# Patient Record
Sex: MALE | Employment: FULL TIME | URBAN - METROPOLITAN AREA
[De-identification: names, ages, dates, MRNs, and addresses within clinical notes are randomized per-mention and may not be internally consistent; named-entity substitution may affect disease eponyms.]

---

## 2017-11-08 NOTE — PROGRESS NOTES
SUBJECTIVE:   CC: Julio Domingo is an 27 year old male who presents for preventative health visit.     Physical   Annual:     Getting at least 3 servings of Calcium per day::  NO    Bi-annual eye exam::  Yes    Dental care twice a year::  NO    Sleep apnea or symptoms of sleep apnea::  Daytime drowsiness    Diet::  Regular (no restrictions) and Breakfast skipped    Frequency of exercise::  None    Taking medications regularly::  No    Barriers to taking medications::  None    Medication side effects::  Not applicable    Additional concerns today::  No    Mole on lip and back of neck he would like removed.      Today's PHQ-2 Score:   PHQ-2 ( 1999 [employer]) 11/10/2017   Q1: Little interest or pleasure in doing things 0   Q2: Feeling down, depressed or hopeless 0   PHQ-2 Score 0   Q1: Little interest or pleasure in doing things Not at all   Q2: Feeling down, depressed or hopeless Not at all   PHQ-2 Score 0     Answers for HPI/ROS submitted by the patient on 11/10/2017   PHQ-2 Score: 0    Abuse: Current or Past(Physical, Sexual or Emotional)- No  Do you feel safe in your environment - Yes    Social History   Substance Use Topics     Smoking status: Former Smoker     Smokeless tobacco: Never Used      Comment: smokes 1-2 cigarettes a day     Alcohol use 0.0 oz/week     0 Standard drinks or equivalent per week      Comment: once a week     The patient does not drink >3 drinks per day nor >7 drinks per week.    Last PSA: No results found for: PSA    Reviewed orders with patient. Reviewed health maintenance and updated orders accordingly - Yes      Reviewed and updated as needed this visit by clinical staff  Tobacco  Allergies  Med Hx  Surg Hx  Fam Hx  Soc Hx        Reviewed and updated as needed this visit by Provider          Review of Systems   Constitutional: Negative.  Negative for chills and fever.   HENT: Negative.  Negative for congestion, ear pain, hearing loss and sore throat.    Eyes: Negative.  Negative for  "pain and visual disturbance.   Respiratory: Negative.  Negative for cough and shortness of breath.    Cardiovascular: Negative.  Negative for chest pain, palpitations and peripheral edema.   Gastrointestinal: Negative.  Negative for abdominal pain, constipation, diarrhea, heartburn, hematochezia and nausea.   Endocrine: Negative.    Genitourinary: Negative.  Negative for discharge, dysuria, frequency, genital sores, hematuria, impotence and urgency.   Musculoskeletal: Negative.  Negative for arthralgias, joint swelling and myalgias.   Skin: Negative.  Negative for rash.   Allergic/Immunologic: Negative.    Neurological: Negative.  Negative for dizziness, weakness, headaches and paresthesias.   Hematological: Negative.    Psychiatric/Behavioral: Negative.  Negative for mood changes. The patient is not nervous/anxious.        OBJECTIVE:   /86 (BP Location: Right arm, Patient Position: Chair, Cuff Size: Adult Regular)  Pulse 72  Temp 98.6  F (37  C) (Temporal)  Resp 20  Ht 5' 11.65\" (1.82 m)  Wt 287 lb (130.2 kg)  SpO2 99%  BMI 39.3 kg/m2    Physical Exam  GENERAL: healthy, alert and no distress  EYES: Eyes grossly normal to inspection, PERRL and conjunctivae and sclerae normal  HENT: ear canals and TM's normal with a moderate amount of cerumen in the right ear canal, nose and mouth without ulcers or lesions  NECK: no adenopathy, no asymmetry, masses, or scars and thyroid normal to palpation  RESP: lungs clear to auscultation - no rales, rhonchi or wheezes  CV: regular rate and rhythm, normal S1 S2, no S3 or S4, no murmur, click or rub, no peripheral edema and peripheral pulses strong  ABDOMEN: soft, nontender, no hepatosplenomegaly, no masses and bowel sounds normal   (male): normal male circumcised genitalia without lesions or urethral discharge, no hernia  MS: no gross musculoskeletal defects noted, no edema. FROM to all extremities. No spinal tenderness.   SKIN: Multiple darkened, symmetric nevi over " chest and back. Tan colored papule over left lower lip.   NEURO: Normal strength and tone, mentation intact and speech normal. Cranial nerves II-XII are grossly intact. DTRs are 2+/4 throughout and symmetric. Gait is stable.   PSYCH: mentation appears normal, affect normal/bright    ASSESSMENT/PLAN:       ICD-10-CM    1. Encounter for routine adult health examination without abnormal findings Z00.00 Basic metabolic panel  (Ca, Cl, CO2, Creat, Gluc, K, Na, BUN)   2. Class 2 obesity due to excess calories without serious comorbidity with body mass index (BMI) of 38.0 to 38.9 in adult E66.09 Basic metabolic panel  (Ca, Cl, CO2, Creat, Gluc, K, Na, BUN)    Z68.38    3. Impacted cerumen of right ear H61.21    4. Skin lesion L98.9 DERMATOLOGY REFERRAL   5. CARDIOVASCULAR SCREENING; LDL GOAL LESS THAN 160 Z13.6 Lipid panel reflex to direct LDL Fasting   6. FH: thyroid condition Z83.49 TSH with free T4 reflex       I discussed the importance of a healthy diet and exercise to help him lose weight and avoid progressing to diabetes.  Fasting labs ordered today.    Nursing staff used 1 bottle to irrigate the right ear but this was unsuccessful. I was only able to remove a small amount of cerumen with a cerumen spoon so I recommend Debrox drops for 3 days and if it does not come out, he can come back in for another irrigation.     Referral placed for dermatology to discuss removal of lesion on lip (has been there his whole life) and some moles on his neck.     Follow up annually.     COUNSELING:   Reviewed preventive health counseling, as reflected in patient instructions       Regular exercise       Healthy diet/nutrition    BP Screening:   Last 3 BP Readings:    BP Readings from Last 3 Encounters:   11/10/17 120/86   11/04/16 122/82   10/19/15 118/86          reports that he has quit smoking. He has never used smokeless tobacco.      Estimated body mass index is 39.3 kg/(m^2) as calculated from the following:    Height as of  "this encounter: 5' 11.65\" (1.82 m).    Weight as of this encounter: 287 lb (130.2 kg).   Weight management plan: Discussed healthy diet and exercise guidelines and patient will follow up in 12 months in clinic to re-evaluate.    Counseling Resources:  ATP IV Guidelines  Pooled Cohorts Equation Calculator  FRAX Risk Assessment  ICSI Preventive Guidelines  Dietary Guidelines for Americans, 2010  USDA's MyPlate  ASA Prophylaxis  Lung CA Screening    Mick Barrientos PA-C  [facility]  "

## 2017-11-08 NOTE — PATIENT INSTRUCTIONS
Preventive Health Recommendations  Male Ages 18 - 25     Yearly exam:             See your health care provider every year in order to  o   Review health changes.   o   Discuss preventive care.    o   Review your medicines if your doctor has prescribed any.    You should be tested each year for STDs (sexually transmitted diseases).     Talk to your provider about cholesterol testing.      If you are at risk for diabetes, you should have a diabetes test (fasting glucose).    Shots: Get a flu shot each year. Get a tetanus shot every 10 years.     Nutrition:    Eat at least 5 servings of fruits and vegetables daily.     Eat whole-grain bread, whole-wheat pasta and brown rice instead of white grains and rice.     Talk to your provider about calcium and Vitamin D.     Lifestyle    Exercise for at least 150 minutes a week (30 minutes a day, 5 days a week). This will help you control your weight and prevent disease.     Limit alcohol to one drink per day.     No smoking.     Wear sunscreen to prevent skin cancer.     See your dentist every six months for an exam and cleaning.       Work on a healthier diet with less fast food and carbs. Try to exercise 3-5 days per week.    I will let you know about your lab results.    You can call to schedule a dermatology appointment.

## 2017-11-08 NOTE — PROGRESS NOTES
"  SUBJECTIVE:                                                    Julio Domingo is a 27 year old male who presents to clinic today for the following health issues:  {Provider please address medication reconciliation discrepancies--rooming staff please delete if no med/rec issues}    HPI    {additional problems for roomer to add, delete if none:462605}    Problem list and histories reviewed & adjusted, as indicated.  Additional history: {NONE - AS DOCUMENTED:246856::\"as documented\"}    {ACUTE Problem SUPERLIST - brief histories:324251}    {HIST REVIEW/ LINKS 2:464627}    {PROVIDER CHARTING PREFERENCE:298772}  "

## 2017-11-10 ENCOUNTER — OFFICE VISIT (OUTPATIENT)
Dept: FAMILY MEDICINE | Facility: OTHER | Age: 27
End: 2017-11-10
Payer: COMMERCIAL

## 2017-11-10 VITALS
OXYGEN SATURATION: 99 % | SYSTOLIC BLOOD PRESSURE: 120 MMHG | BODY MASS INDEX: 38.87 KG/M2 | DIASTOLIC BLOOD PRESSURE: 86 MMHG | HEIGHT: 72 IN | TEMPERATURE: 98.6 F | RESPIRATION RATE: 20 BRPM | HEART RATE: 72 BPM | WEIGHT: 287 LBS

## 2017-11-10 DIAGNOSIS — Z83.49 FH: THYROID CONDITION: ICD-10-CM

## 2017-11-10 DIAGNOSIS — E66.812 CLASS 2 OBESITY DUE TO EXCESS CALORIES WITHOUT SERIOUS COMORBIDITY WITH BODY MASS INDEX (BMI) OF 38.0 TO 38.9 IN ADULT: ICD-10-CM

## 2017-11-10 DIAGNOSIS — E66.09 CLASS 2 OBESITY DUE TO EXCESS CALORIES WITHOUT SERIOUS COMORBIDITY WITH BODY MASS INDEX (BMI) OF 38.0 TO 38.9 IN ADULT: ICD-10-CM

## 2017-11-10 DIAGNOSIS — L98.9 SKIN LESION: ICD-10-CM

## 2017-11-10 DIAGNOSIS — H61.21 IMPACTED CERUMEN OF RIGHT EAR: ICD-10-CM

## 2017-11-10 DIAGNOSIS — Z00.00 ENCOUNTER FOR ROUTINE ADULT HEALTH EXAMINATION WITHOUT ABNORMAL FINDINGS: Primary | ICD-10-CM

## 2017-11-10 DIAGNOSIS — Z13.6 CARDIOVASCULAR SCREENING; LDL GOAL LESS THAN 160: ICD-10-CM

## 2017-11-10 LAB
ANION GAP SERPL CALCULATED.3IONS-SCNC: 8 MMOL/L (ref 3–14)
BUN SERPL-MCNC: 15 MG/DL (ref 7–30)
CALCIUM SERPL-MCNC: 9.3 MG/DL (ref 8.5–10.1)
CHLORIDE SERPL-SCNC: 106 MMOL/L (ref 94–109)
CHOLEST SERPL-MCNC: 201 MG/DL
CO2 SERPL-SCNC: 27 MMOL/L (ref 20–32)
CREAT SERPL-MCNC: 0.83 MG/DL (ref 0.66–1.25)
GFR SERPL CREATININE-BSD FRML MDRD: >90 ML/MIN/1.7M2
GLUCOSE SERPL-MCNC: 99 MG/DL (ref 70–99)
HDLC SERPL-MCNC: 40 MG/DL
LDLC SERPL CALC-MCNC: 133 MG/DL
NONHDLC SERPL-MCNC: 161 MG/DL
POTASSIUM SERPL-SCNC: 4.1 MMOL/L (ref 3.4–5.3)
SODIUM SERPL-SCNC: 141 MMOL/L (ref 133–144)
TRIGL SERPL-MCNC: 142 MG/DL
TSH SERPL DL<=0.005 MIU/L-ACNC: 1.48 MU/L (ref 0.4–4)

## 2017-11-10 PROCEDURE — 99395 PREV VISIT EST AGE 18-39: CPT | Performed by: PHYSICIAN ASSISTANT

## 2017-11-10 PROCEDURE — 80061 LIPID PANEL: CPT | Performed by: PHYSICIAN ASSISTANT

## 2017-11-10 PROCEDURE — 36415 COLL VENOUS BLD VENIPUNCTURE: CPT | Performed by: PHYSICIAN ASSISTANT

## 2017-11-10 PROCEDURE — 80048 BASIC METABOLIC PNL TOTAL CA: CPT | Performed by: PHYSICIAN ASSISTANT

## 2017-11-10 PROCEDURE — 84443 ASSAY THYROID STIM HORMONE: CPT | Performed by: PHYSICIAN ASSISTANT

## 2017-11-10 ASSESSMENT — ENCOUNTER SYMPTOMS
FREQUENCY: 0
DYSURIA: 0
MUSCULOSKELETAL NEGATIVE: 1
EYE PAIN: 0
SORE THROAT: 0
RESPIRATORY NEGATIVE: 1
PALPITATIONS: 0
CARDIOVASCULAR NEGATIVE: 1
PSYCHIATRIC NEGATIVE: 1
CHILLS: 0
EYES NEGATIVE: 1
DIZZINESS: 0
CONSTIPATION: 0
ENDOCRINE NEGATIVE: 1
DIARRHEA: 0
FEVER: 0
SHORTNESS OF BREATH: 0
HEMATURIA: 0
NERVOUS/ANXIOUS: 0
ALLERGIC/IMMUNOLOGIC NEGATIVE: 1
NAUSEA: 0
GASTROINTESTINAL NEGATIVE: 1
NEUROLOGICAL NEGATIVE: 1
CONSTITUTIONAL NEGATIVE: 1
JOINT SWELLING: 0
WEAKNESS: 0
HEMATOLOGIC/LYMPHATIC NEGATIVE: 1
PARESTHESIAS: 0
MYALGIAS: 0
HEADACHES: 0
COUGH: 0
HEMATOCHEZIA: 0
ABDOMINAL PAIN: 0
ARTHRALGIAS: 0
HEARTBURN: 0

## 2017-11-10 ASSESSMENT — PAIN SCALES - GENERAL: PAINLEVEL: NO PAIN (0)

## 2017-11-10 NOTE — MR AVS SNAPSHOT
After Visit Summary   11/10/2017    Julio Domingo    MRN: [id number]           Patient Information     Date Of Birth          1990        Visit Information        Provider Department      11/10/2017 9:30 AM Mick Barrientos PA-C [facility]        Today's Diagnoses     Encounter for routine adult health examination without abnormal findings    -  1    Class 2 obesity due to excess calories without serious comorbidity with body mass index (BMI) of 38.0 to 38.9 in adult        Impacted cerumen of right ear        Skin lesion        CARDIOVASCULAR SCREENING; LDL GOAL LESS THAN 160        FH: thyroid condition          Care Instructions      Preventive Health Recommendations  Male Ages 18 - 25     Yearly exam:             See your health care provider every year in order to  o   Review health changes.   o   Discuss preventive care.    o   Review your medicines if your doctor has prescribed any.    You should be tested each year for STDs (sexually transmitted diseases).     Talk to your provider about cholesterol testing.      If you are at risk for diabetes, you should have a diabetes test (fasting glucose).    Shots: Get a flu shot each year. Get a tetanus shot every 10 years.     Nutrition:    Eat at least 5 servings of fruits and vegetables daily.     Eat whole-grain bread, whole-wheat pasta and brown rice instead of white grains and rice.     Talk to your provider about calcium and Vitamin D.     Lifestyle    Exercise for at least 150 minutes a week (30 minutes a day, 5 days a week). This will help you control your weight and prevent disease.     Limit alcohol to one drink per day.     No smoking.     Wear sunscreen to prevent skin cancer.     See your dentist every six months for an exam and cleaning.       Work on a healthier diet with less fast food and carbs. Try to exercise 3-5 days per week.    I will let you know about your lab results.    You can call to schedule a  "dermatology appointment.              Follow-ups after your visit        Additional Services     DERMATOLOGY REFERRAL       Your provider has referred you to: [facility]: [facility] [phone]   [email or web address]    Mole on lip he would like removed    Please be aware that coverage of these services is subject to the terms and limitations of your health insurance plan.  Call member services at your health plan with any benefit or coverage questions.      Please bring the following with you to your appointment:    (1) Any X-Rays, CTs or MRIs which have been performed.  Contact the facility where they were done to arrange for  prior to your scheduled appointment.    (2) List of current medications  (3) This referral request   (4) Any documents/labs given to you for this referral                  Who to contact     If you have questions or need follow up information about today's clinic visit or your schedule please contact [facility] directly at [phone].  Normal or non-critical lab and imaging results will be communicated to you by MyChart, letter or phone within 4 business days after the clinic has received the results. If you do not hear from us within 7 days, please contact the clinic through MyChart or phone. If you have a critical or abnormal lab result, we will notify you by phone as soon as possible.  Submit refill requests through [employer] or call your pharmacy and they will forward the refill request to us. Please allow 3 business days for your refill to be completed.          Additional Information About Your Visit        [employer] Information     [employer] lets you send messages to your doctor, view your test results, renew your prescriptions, schedule appointments and more. To sign up, go to [email or web address] . Click on \"Log in\" on the left side of the screen, which will take you to the Welcome " "page. Then click on \"Sign up Now\" on the right side of the page.     You will be asked to enter the access code listed below, as well as some personal information. Please follow the directions to create your username and password.     Your access code is: [email or web address]  Expires: 2018  9:55 AM     Your access code will  in 90 days. If you need help or a new code, please call your [place] clinic or [phone].        Care EveryWhere ID     This is your Care EveryWhere ID. This could be used by other organizations to access your [place] medical records  [id number]        Your Vitals Were     Pulse Temperature Respirations Height Pulse Oximetry BMI (Body Mass Index)    72 98.6  F (37  C) (Temporal) 20 5' 11.65\" (1.82 m) 99% 39.3 kg/m2       Blood Pressure from Last 3 Encounters:   11/10/17 120/86   16 122/82   10/19/15 118/86    Weight from Last 3 Encounters:   11/10/17 287 lb (130.2 kg)   16 287 lb 12.8 oz (130.5 kg)   10/19/15 276 lb (125.2 kg)              We Performed the Following     Basic metabolic panel  (Ca, Cl, CO2, Creat, Gluc, K, Na, BUN)     DERMATOLOGY REFERRAL     Lipid panel reflex to direct LDL Fasting     TSH with free T4 reflex        Primary Care Provider Office Phone # Fax #    Mick Barrientos PA-C [phone]       [address] [place] [id number]        Equal Access to Services     MAAME GARCIA : Hadii venkata browneo Sonilda, waaxda luqadaha, qaybta kaalmada adeelayaleatha, morena chirinos . So [facility] [phone].    ATENCIÓN: Si habla español, tiene a murphy disposición servicios gratuitos de asistencia lingüística. Llame al [phone].    We comply with applicable federal civil rights laws and [place] laws. We do not discriminate on the basis of race, color, national origin, age, disability, sex, sexual orientation, or gender identity.            Thank you!     Thank you for choosing [facility]  for your " care. Our goal is always to provide you with excellent care. Hearing back from our patients is one way we can continue to improve our services. Please take a few minutes to complete the written survey that you may receive in the mail after your visit with us. Thank you!             Your Updated Medication List - Protect others around you: Learn how to safely use, store and throw away your medicines at www.disposemymeds.org.      Notice  As of 11/10/2017  9:55 AM    You have not been prescribed any medications.

## 2017-11-10 NOTE — NURSING NOTE
"Chief Complaint   Patient presents with     Physical     Panel Management     flu, mychart       Initial /86 (BP Location: Right arm, Patient Position: Chair, Cuff Size: Adult Regular)  Pulse 72  Temp 98.6  F (37  C) (Temporal)  Resp 20  Ht 5' 11.65\" (1.82 m)  Wt 287 lb (130.2 kg)  SpO2 99%  BMI 39.3 kg/m2 Estimated body mass index is 39.3 kg/(m^2) as calculated from the following:    Height as of this encounter: 5' 11.65\" (1.82 m).    Weight as of this encounter: 287 lb (130.2 kg).  Medication Reconciliation: complete     Pamela Solares, GRACE      "

## 2017-11-16 ENCOUNTER — TELEPHONE (OUTPATIENT)
Dept: FAMILY MEDICINE | Facility: OTHER | Age: 27
End: 2017-11-16

## 2017-11-16 NOTE — TELEPHONE ENCOUNTER
Form doesn't need provider signature. Copy made for scanning.  No fax number on form. It prompts patient to sign into their account to enter the information.    Left message for patient to call back. Please let him know form is completed and placed at  for him to .  Pamela Solares, [facility]

## 2017-11-16 NOTE — TELEPHONE ENCOUNTER
Reason for Call:  Form, our goal is to have forms completed with 72 hours, however, some forms may require a visit or additional information.    Type of letter, form or note:  medical    Who is the form from?: physical form (if other please explain)    Where did the form come from: Patient or family brought in       What clinic location was the form placed at?: [facility] - [phone]    Where the form was placed: 's Box    What number is listed as a contact on the form?: [phone]       Additional comments: sign fax back    Call taken on 11/16/2017 at 10:41 AM by Heydi Eng

## 2021-04-14 ENCOUNTER — OFFICE VISIT (OUTPATIENT)
Dept: FAMILY MEDICINE | Facility: OTHER | Age: 31
End: 2021-04-14
Payer: COMMERCIAL

## 2021-04-14 VITALS
WEIGHT: 290 LBS | DIASTOLIC BLOOD PRESSURE: 98 MMHG | HEART RATE: 112 BPM | HEIGHT: 72 IN | BODY MASS INDEX: 39.28 KG/M2 | OXYGEN SATURATION: 95 % | RESPIRATION RATE: 20 BRPM | SYSTOLIC BLOOD PRESSURE: 150 MMHG | TEMPERATURE: 98.2 F

## 2021-04-14 DIAGNOSIS — J02.9 PHARYNGITIS, UNSPECIFIED ETIOLOGY: Primary | ICD-10-CM

## 2021-04-14 LAB
DEPRECATED S PYO AG THROAT QL EIA: NEGATIVE
SPECIMEN SOURCE: NORMAL
SPECIMEN SOURCE: NORMAL
STREP GROUP A PCR: NOT DETECTED

## 2021-04-14 PROCEDURE — 99213 OFFICE O/P EST LOW 20 MIN: CPT | Performed by: FAMILY MEDICINE

## 2021-04-14 PROCEDURE — 99N1174 PR STATISTIC STREP A RAPID: Performed by: FAMILY MEDICINE

## 2021-04-14 PROCEDURE — 87651 STREP A DNA AMP PROBE: CPT | Performed by: FAMILY MEDICINE

## 2021-04-14 RX ORDER — AMOXICILLIN 400 MG/5ML
1500 POWDER, FOR SUSPENSION ORAL 2 TIMES DAILY
Qty: 400 ML | Refills: 0 | Status: SHIPPED | OUTPATIENT
Start: 2021-04-14 | End: 2021-04-24

## 2021-04-14 ASSESSMENT — MIFFLIN-ST. JEOR: SCORE: 2324.18

## 2021-04-14 ASSESSMENT — PAIN SCALES - GENERAL: PAINLEVEL: EXTREME PAIN (8)

## 2021-04-14 NOTE — PROGRESS NOTES
"    Assessment & Plan       ICD-10-CM    1. Pharyngitis, unspecified etiology  J02.9 Streptococcus A Rapid Scr w Reflx to PCR     Group A Streptococcus PCR Throat Swab     amoxicillin (AMOXIL) 400 MG/5ML suspension        Classic appearance for strep, so given antibiotics even though strep negative from rapid screen. Culture pending.  I am concerned about the size of the tonsil and as it is pushing near the uvula on the L, discussed that if negative for strep and is not improving, may need more urgent ENT evaluation as may be more of a developing abscess.    Review of the result(s) of each unique test - strep negative  14 minutes spent on the date of the encounter doing chart review, history and exam, documentation and further activities per the note       BMI:   Estimated body mass index is 39.28 kg/m  as calculated from the following:    Height as of this encounter: 1.83 m (6' 0.05\").    Weight as of this encounter: 131.5 kg (290 lb).   Weight management plan: Discussed healthy diet and exercise guidelines      No follow-ups on file.    Leigh Ivey MD, MD  [facility] MATT Kim is a 30 year old who presents for the following health issues     HPI     Acute Illness  Acute illness concerns: sore throat   Onset/Duration: 3 days  Symptoms:  Fever: no  Chills/Sweats: no  Headache (location?): no  Sinus Pressure: no  Conjunctivitis:  no  Ear Pain: no  Rhinorrhea: no  Congestion: no  Sore Throat: YES  Cough: no  Wheeze: no  Decreased Appetite: no  Nausea: no  Vomiting: no  Diarrhea: no  Dysuria/Freq.: no  Dysuria or Hematuria: no  Fatigue/Achiness: no  Sick/Strep Exposure: no  Therapies tried and outcome: ibuprofen, benzocaine, throat lozenges, cool spray       Review of Systems   Constitutional, HEENT, cardiovascular, pulmonary, GI, , musculoskeletal, neuro, skin, endocrine and psych systems are negative, except as otherwise noted.      Objective    BP (!) 150/98   Pulse 112   Temp " "98.2  F (36.8  C) (Temporal)   Resp 20   Ht 1.83 m (6' 0.05\")   Wt 131.5 kg (290 lb)   SpO2 95%   BMI 39.28 kg/m    Body mass index is 39.28 kg/m .  Physical Exam   GENERAL: healthy, alert and no distress  EYES: Eyes grossly normal to inspection, PERRL and conjunctivae and sclerae normal  HENT: normal cephalic/atraumatic, both ears: clear effusion, nose and mouth without ulcers or lesions, oropharynx clear, oral mucous membranes moist, tonsillar hypertrophy L>R, with nearing midline from L, and tonsillar exudate  NECK: L>R anterior cervical adenopathy, no asymmetry, masses, or scars and thyroid normal to palpation  RESP: lungs clear to auscultation - no rales, rhonchi or wheezes  CV: regular rate and rhythm, normal S1 S2, no S3 or S4, no murmur, click or rub, no peripheral edema and peripheral pulses strong  ABDOMEN: soft, nontender, no hepatosplenomegaly, no masses and bowel sounds normal  MS: no gross musculoskeletal defects noted, no edema  SKIN: no suspicious lesions or rashes  NEURO: Normal strength and tone, mentation intact and speech normal  PSYCH: mentation appears normal, affect normal/bright                "

## 2021-04-15 ENCOUNTER — TELEPHONE (OUTPATIENT)
Dept: FAMILY MEDICINE | Facility: OTHER | Age: 31
End: 2021-04-15

## 2021-04-15 NOTE — TELEPHONE ENCOUNTER
Will have RN call-- Dr. Edmondson in today off tomorrow.. full-- Dr. CROWLEY will have to call [phone] and ask for Dr. Winchester     Call to see if improved with the antibiotics given yesterday. As not strep by culture, may likely not be.    Would recommend ENT referral urgently if not improving as this looked like a possible developing abscess.   Please send me ENT number to request work in or schedule if they will allow today or tomorrow.   Leigh Ivey MD

## 2021-04-15 NOTE — RESULT ENCOUNTER NOTE
Call to see if improved with the antibiotics given yesterday. As not strep by culture, may likely not be.   Would recommend ENT referral urgently if not improving as this looked like a possible developing abscess.   Please send me ENT number to request work in or schedule if they will allow today or tomorrow.  Leigh Ivey MD

## 2021-04-15 NOTE — TELEPHONE ENCOUNTER
RN Triage    Patient Contact    Attempt # 1    Was call answered?  No.  Unable to leave message. Unidentified voice mail.    Isi Domingo RN on 4/15/2021 at 9:46 AM

## 2021-04-16 NOTE — TELEPHONE ENCOUNTER
His swelling has gone down a little bit.    He will call back if his swelling does not continue to improve.    Isi Domingo RN on 4/16/2021 at 7:29 AM

## 2021-11-21 ENCOUNTER — HEALTH MAINTENANCE LETTER (OUTPATIENT)
Age: 31
End: 2021-11-21

## 2023-01-08 ENCOUNTER — HEALTH MAINTENANCE LETTER (OUTPATIENT)
Age: 33
End: 2023-01-08

## 2024-02-11 ENCOUNTER — HEALTH MAINTENANCE LETTER (OUTPATIENT)
Age: 34
End: 2024-02-11